# Patient Record
Sex: FEMALE | Race: WHITE | NOT HISPANIC OR LATINO | Employment: FULL TIME | URBAN - METROPOLITAN AREA
[De-identification: names, ages, dates, MRNs, and addresses within clinical notes are randomized per-mention and may not be internally consistent; named-entity substitution may affect disease eponyms.]

---

## 2023-07-30 ENCOUNTER — HOSPITAL ENCOUNTER (EMERGENCY)
Facility: CLINIC | Age: 58
Discharge: HOME | End: 2023-07-30
Attending: EMERGENCY MEDICINE | Admitting: EMERGENCY MEDICINE
Payer: COMMERCIAL

## 2023-07-30 VITALS
BODY MASS INDEX: 44.98 KG/M2 | HEART RATE: 107 BPM | SYSTOLIC BLOOD PRESSURE: 152 MMHG | DIASTOLIC BLOOD PRESSURE: 99 MMHG | HEIGHT: 65 IN | OXYGEN SATURATION: 97 % | TEMPERATURE: 97.4 F | RESPIRATION RATE: 20 BRPM | WEIGHT: 270 LBS

## 2023-07-30 DIAGNOSIS — T63.441A BEE STING, ACCIDENTAL OR UNINTENTIONAL, INITIAL ENCOUNTER: ICD-10-CM

## 2023-07-30 PROCEDURE — 99284 EMERGENCY DEPT VISIT MOD MDM: CPT

## 2023-07-30 RX ORDER — CLINDAMYCIN HCL 300 MG
300 CAPSULE ORAL 4 TIMES DAILY
Qty: 20 CAPSULE | Refills: 0 | Status: SHIPPED | OUTPATIENT
Start: 2023-07-30 | End: 2023-08-04

## 2023-07-30 RX ORDER — PREDNISONE 20 MG/1
40 TABLET ORAL DAILY
Qty: 8 TABLET | Refills: 0 | Status: SHIPPED | OUTPATIENT
Start: 2023-07-30 | End: 2023-08-03

## 2023-07-31 NOTE — DISCHARGE INSTRUCTIONS
You were stung by a bee today.  Take the prescribed steroid medication as directed.  Over the next 2 days, if you start to develop further redness and swelling, you can also take the prescribed antibiotic.  Follow-up with your primary care doctor and return to the ER for any worsening symptoms or other concerns.

## 2023-07-31 NOTE — ED PROVIDER NOTES
EMERGENCY DEPARTMENT ENCOUnter      NAME: Sylvia Avendano  AGE: 58 year old female  YOB: 1965  MRN: [id number]  EVALUATION DATE & TIME: 2023  7:50 PM    PCP: No primary care provider on file.    ED PROVIDER: Thierno Glaser DO      Chief Complaint   Patient presents with    Allergic Reaction         FINAL IMPRESSION:  1. Bee sting, accidental or unintentional, initial encounter          ED COURSE & MEDICAL DECISION MAKIN:00 PM Met with and introduced myself to the patient. Discussed history and plan of care.  8:05 PM Discussed plan for discharge.      The patient presented to the emergency department today after suffering a bee sting to her left ring finger.  She has some pain and swelling at this site.  She took some Benadryl prior to arrival.  No systemic symptoms or findings on exam.  Given her more serious reactions in the past, I will prescribe a short course of oral steroids.  She is also concerned that this may develop into a skin infection.  I do not see any signs of that at this time but since [place] I will prescribe a short course of antibiotics to be taken only if she develops worsening symptoms in the next few days.  She is comfortable with this plan.        Medical Decision Making    History:  Supplemental history from: Family Member/Significant Other  External Record(s) reviewed: Documented in chart, if applicable.    Work Up:  Chart documentation includes differential considered and any EKGs or imaging independently interpreted by provider, where specified.  In additional to work up documented, I considered the following work up: Documented in chart, if applicable.    External consultation:  Discussion of management with another provider: Documented in chart, if applicable    Complicating factors:  Care impacted by chronic illness: Hypertension  Care affected by social determinants of health: N/A    Disposition considerations:  Discharge. I prescribed additional prescription strength medication(s) as charted. See documentation for any additional details.        At the conclusion of the encounter I discussed the results of all of the tests and the disposition. The questions were answered. The patient or family acknowledged understanding and was agreeable with the care plan.         NEW PRESCRIPTIONS STARTED AT TODAY'S ER VISIT  Discharge Medication List as of 7/30/2023  8:15 PM        START taking these medications    Details   clindamycin (CLEOCIN) 300 MG capsule Take 1 capsule (300 mg) by mouth 4 times daily for 5 days, Disp-20 capsule, R-0, E-Prescribe      predniSONE (DELTASONE) 20 MG tablet Take 2 tablets (40 mg) by mouth daily for 4 days Take two tablets (= 40mg) each day for 4 (four) days, Disp-8 tablet, R-0, E-Prescribe                =================================================================    HPI    Sylvia Avendano is a 58 year old female with a pertinent history of hypertension who presents to this ED via walk in for evaluation of allergic reaction.    The patient reports that around 5:45 PM today, she was stung by a bee on her left ring finger. She states that she is allergic to bee stings. She endorses swelling and numbness to the finger that have improved since onset. Prior to arrival, she took two benadryl. The patient states that when she was stung by a bee in the past, the numbness radiated up her arm and she had to be placed on a course of antibiotics and steroids. She does not endorse any current difficulty breathing. No other concerns reported at this time.    PAST MEDICAL HISTORY:  History reviewed. No pertinent past medical history.    PAST SURGICAL HISTORY:  History reviewed. No pertinent surgical history.        CURRENT MEDICATIONS:    clindamycin (CLEOCIN) 300 MG capsule  predniSONE (DELTASONE) 20 MG tablet        ALLERGIES:  Allergies   Allergen Reactions    Bee Venom Anaphylaxis    Ciprofloxacin  "Unknown and Hives    Beta Adrenergic Blockers Other (See Comments)     Patient is on allergy injections, Beta Blockers contraindicated. If medically necessary, it is OK to prescribe a Beta Blocker, but the allergy injections will need to be discontinued.    A    Cephalosporins Swelling     Lips and tounge    Sulfa Antibiotics Unknown    Adhesive Tape Rash     Bandaids and most adhesives    Penicillins Unknown and Rash     Childhood reaction    Sulfanilamide Rash       FAMILY HISTORY:  History reviewed. No pertinent family history.    SOCIAL HISTORY:   Social History     Socioeconomic History    Marital status:      Spouse name: None    Number of children: None    Years of education: None    Highest education level: None       VITALS:  Patient Vitals for the past 24 hrs:   BP Temp Temp src Pulse Resp SpO2 Height Weight   07/30/23 1947 (!) 152/99 97.4  F (36.3  C) Temporal 107 20 97 % 1.651 m (5' 5\") 122.5 kg (270 lb)       PHYSICAL EXAM    VITAL SIGNS: BP (!) 152/99   Pulse 107   Temp 97.4  F (36.3  C) (Temporal)   Resp 20   Ht 1.651 m (5' 5\")   Wt 122.5 kg (270 lb)   SpO2 97%   BMI 44.93 kg/m     Constitutional:  Well developed, Well nourished,  HENT:  Normocephalic, Atraumatic, Oropharynx moist, Nose normal.   Neck:  Normal range of motion, Supple, No stridor.   Eyes:  EOMI, Conjunctiva normal, No discharge.   Respiratory:  Breathing comfortably, No respiratory distress,    Cardiovascular:  Normal pulses   Musculoskeletal:  No edema or cyanosis, no deformities.  Integument:  Dry. Small area of erythema and swelling over distal left ring finger.  Neurologic:  Alert & oriented x 3, No obvious focal deficits noted.   Psychiatric:  Affect normal, Judgment normal, Mood normal.         I, Michelle Pedroza, am serving as a scribe to document services personally performed by Dr. Glaser based on my observation and the provider's statements to me. I, Thierno Glaser, DO attest that Michelle Pedroza is acting in a " [facility], has observed my performance of the services and has documented them in accordance with my direction.    Thierno Glaser DO  Emergency Medicine  [facility] EMERGENCY ROOM  [address] [facility] [address]  [phone]  Dept: [phone]     Thierno Glaser MD  07/30/23 2043

## 2023-07-31 NOTE — ED TRIAGE NOTES
Pt stung by bee on left hand ring finger around 1750 this evening. Swelling noted in finger. Pt feels dry mouth currently but denies trouble breathing. No hives noted. Denies nausea, vomiting. Has an epi pen but did not give it to herself. Took 2 benadryl PTA.